# Patient Record
Sex: FEMALE | Race: WHITE | NOT HISPANIC OR LATINO
[De-identification: names, ages, dates, MRNs, and addresses within clinical notes are randomized per-mention and may not be internally consistent; named-entity substitution may affect disease eponyms.]

---

## 2024-04-16 ENCOUNTER — APPOINTMENT (OUTPATIENT)
Dept: ORTHOPEDIC SURGERY | Facility: CLINIC | Age: 44
End: 2024-04-16
Payer: OTHER GOVERNMENT

## 2024-04-16 DIAGNOSIS — M54.42 LUMBAGO WITH SCIATICA, LEFT SIDE: ICD-10-CM

## 2024-04-16 DIAGNOSIS — G89.29 LUMBAGO WITH SCIATICA, LEFT SIDE: ICD-10-CM

## 2024-04-16 PROCEDURE — 72100 X-RAY EXAM L-S SPINE 2/3 VWS: CPT

## 2024-04-16 PROCEDURE — 99204 OFFICE O/P NEW MOD 45 MIN: CPT

## 2024-04-16 RX ORDER — METHYLPREDNISOLONE 4 MG/1
4 TABLET ORAL
Qty: 1 | Refills: 0 | Status: ACTIVE | COMMUNITY
Start: 2024-04-16 | End: 1900-01-01

## 2024-04-16 NOTE — HISTORY OF PRESENT ILLNESS
[de-identified] : 4/16/24 44 F with LBP on left and radiating down left leg.  Pain radiates to left posterior thigh.  Nothing below knee.  Has had history of back pain worst 15-16 years ago which required UNIQUE and MBB with relief.  Has been taking OTC NSAIDS without any relief.  No bowel or bladder symptoms.      [employer]    The patient is a 44 year old female who presents today complaining of back pain Date of Injury/Onset: 04/10/24 Pain: At Rest: 5/10 With Activity:  7/10 Mechanism of injury: NKI, pt went to get off the bed and felt a sharp pain.  Quality of symptoms: Sharp pian that has radiated down Lt leg  Improves with: [employer] patches  Worse with: sitting, standing ,bending  Prior treatment: No Prior Imaging: No Additional Information: herniated disc L-5 L-4  Had previous injections on Rt side of lower back, in 2008.

## 2024-04-16 NOTE — IMAGING
[de-identified] : Spine: Inspection/Palpation: No tenderness to palpation throughout Cervical/thoracic/lumbar spine. No bony stepoffs, No lesions.  Gait: Non-antalgic, able to perform bilateral toe and heel rise.  Able to perform tandem gait.    Range of Motion: Lumbar Spine: Flexion to 90 degrees, Extension to 30 degrees,  Neurologic:  Bilateral Lower Extremities 5/5 Iliopsoas/Quadriceps/Hamstrings/ Tibialis Anterior/ Gastrocnemius. Extensor Hallucis Longus/ Flexor Hallucis Longus except    Sensation intact to light touch L2-S1  Patellar/ Achilles reflex within normal limits.   Negative straight leg raise  No pain with IR/ER/Flexion of HIps bilaterally   X-ray Ap/Lateral/Flexion/Extension of lumbar spine were viewed and interpreted.  Normal alignment is maintained without any spondylolisthesis.  coronal curvature consistent with spasm . L4-5 and L5-s1 disc height loss

## 2024-05-28 ENCOUNTER — APPOINTMENT (OUTPATIENT)
Dept: ORTHOPEDIC SURGERY | Facility: CLINIC | Age: 44
End: 2024-05-28